# Patient Record
(demographics unavailable — no encounter records)

---

## 2024-12-05 NOTE — DISCUSSION/SUMMARY
[FreeTextEntry1] : 52 YO M with HTN, 3/24 ACS S/P PCI of the prox LAD/shockwave with residual 70% OM1 disease, on ASA/brilinta/statin/BB. EF: 65% no sig valvular disease who is here post hospital F/U. Patient is doing well, taking meds. EKG unremarkable  Patient is at rehab up to 5 METS with no symptoms. No CP/SOB/syncope. Taking ASA/brilinta/statin/BB. EKG unchanged  LDL: 77 on max statin  First degree AV block   Continue the ASA/brilinta/statin/toprol 25mg.Brilinta until 3/25 LDL of 40s continue the Zetia and statin  [EKG obtained to assist in diagnosis and management of assessed problem(s)] : EKG obtained to assist in diagnosis and management of assessed problem(s)

## 2024-12-05 NOTE — CARDIOLOGY SUMMARY
[de-identified] : 2/24   Right radial access  Right Dominant system   LVEDP: 10mmHg  Moderate calcification   LM: mild diffuse disease  LAD: prox: 80% stenosis. mid: mild diffuse disease. Distal: mild diffuse disease. D1: 90% ostial disease, large size  LCX: mild diffuse disease. OM1: 70% stenosis, moderate size  Ramus: 50% ostial disease, large size  RCA: prox: mild diffuse disease, mid: moderate diffuse disease. distal: mild diffuse disease. RPDA: 70% ostial stenosis. RPL: mild diffuse disease   IVUS showed moderate calcification of the prox LAD. Prox: 3.5mm, distal healthy landing size: 3.0mm.  Shockwave of the prox LAD with a 3.5.  PCI with 3.0x38 and post dilatation with a 3.0 distally and 3.5 (3.0 high pressure to 20) proximally. TR band.  Patient loaded with brilinta

## 2025-03-07 NOTE — DISCUSSION/SUMMARY
Individual Follow-Up Form    9/10/2019    Quit Date:     Clinical Status of Patient: Outpatient    Length of Service: 60 minutes    Continuing Medication: yes  Patches    Other Medications: Lozenges     Target Symptoms: Withdrawal and medication side effects. The following were  rated moderate (3) to severe (4) on TCRS:  · Moderate (3): Desire or Crave tobacco; Restlessness  · Severe (4): None    Comments: Patient states she's smoking about 2-3 cigarettes per day. CO 7 ppm (<6 is nonsmoker) last smoked 1 day prior. Session Focus: completion of TCRS (Tobacco Cessation Rating Scale) reviewed strategies, habitual behavior, high risks situations, understanding urges and cravings, stress and relaxation with open discussion and additional interventions, Introduced lapses, relapses, understanding them and analyzing the situation of a lapse, conflict issues that may be linked to a lapse. The patient denies any abnormal behavioral or mental changes at this time. The patient will continue with group therapy sessions and medication monitoring by CTTS. Patient not taking any cessation medications.     Diagnosis: F17.210    Next Visit: 1 week   [FreeTextEntry1] : 54 YO M with HTN, 3/24 ACS S/P PCI of the prox LAD/shockwave with residual 70% OM1 disease, on ASA/brilinta/statin/BB. EF: 65% no sig valvular disease who is here post hospital F/U. Patient is doing well, taking meds. EKG unremarkable  Patient is at rehab up to 5 METS with no symptoms. No CP/SOB/syncope. Taking ASA/brilinta/statin/BB. EKG unchanged  First degree AV block    CAD status post PCI of proximal LAD with 70% OM1 stenosis. Negative nuclear stress test. Patient is asymptomatic Continue the ASA/brilinta/statin/toprol 25mg.Will stop Brilinta this month and only continue the patient on aspirin. LDL of 40s continue the Zetia and statin  Will do carotid ultrasound in the next visit.  [EKG obtained to assist in diagnosis and management of assessed problem(s)] : EKG obtained to assist in diagnosis and management of assessed problem(s)

## 2025-03-07 NOTE — HISTORY OF PRESENT ILLNESS
[FreeTextEntry1] : 52 YO M with HTN, 3/24 ACS S/P PCI of the prox LAD/shockwave with residual 70% OM1 disease, on ASA/brilinta/statin/BB. EF: 65% no sig valvular disease who is here post hospital F/U. Patient is doing well, taking meds.   6/6/24 Patient is at rehab up to 5 METS with no symptoms. No CP/SOB/syncope. Taking ASA/brilinta/statin/BB. EKG unchanged  LDL: 77 on max statin. EGD with no ulcers   9/12/24 Holter monitor with first-degree AV block no significant arrhythmia. Patient has been having fatigue after long days of physical work and also on and off chest pain with exertion.  No shortness of breath/syncope.  LDL of 40s on Zetia and statin  12/5/24 Patient has no CP/SOB/PABON/syncope. Taking his meds. LDL: 40s on 11/24. TTE unremarkable. SPECT with no ischemia   3/7/2025 patient is asymptomatic.  No chest pain/shortness of breath/dyspnea on exertion/syncope.  Patient is exercising well.  LDL of 44 on 3/25.

## 2025-03-07 NOTE — CARDIOLOGY SUMMARY
[de-identified] : 2/24   Right radial access  Right Dominant system   LVEDP: 10mmHg  Moderate calcification   LM: mild diffuse disease  LAD: prox: 80% stenosis. mid: mild diffuse disease. Distal: mild diffuse disease. D1: 90% ostial disease, large size  LCX: mild diffuse disease. OM1: 70% stenosis, moderate size  Ramus: 50% ostial disease, large size  RCA: prox: mild diffuse disease, mid: moderate diffuse disease. distal: mild diffuse disease. RPDA: 70% ostial stenosis. RPL: mild diffuse disease   IVUS showed moderate calcification of the prox LAD. Prox: 3.5mm, distal healthy landing size: 3.0mm.  Shockwave of the prox LAD with a 3.5.  PCI with 3.0x38 and post dilatation with a 3.0 distally and 3.5 (3.0 high pressure to 20) proximally. TR band.  Patient loaded with brilinta